# Patient Record
Sex: FEMALE | Race: WHITE | URBAN - METROPOLITAN AREA
[De-identification: names, ages, dates, MRNs, and addresses within clinical notes are randomized per-mention and may not be internally consistent; named-entity substitution may affect disease eponyms.]

---

## 2017-01-09 ENCOUNTER — OFFICE VISIT (OUTPATIENT)
Dept: ORTHOPEDIC SURGERY | Age: 47
End: 2017-01-09

## 2017-01-09 DIAGNOSIS — M25.571 ACUTE RIGHT ANKLE PAIN: Primary | ICD-10-CM

## 2017-01-09 PROCEDURE — 73610 X-RAY EXAM OF ANKLE: CPT | Performed by: ORTHOPAEDIC SURGERY

## 2017-01-09 PROCEDURE — 99213 OFFICE O/P EST LOW 20 MIN: CPT | Performed by: ORTHOPAEDIC SURGERY

## 2017-01-24 ENCOUNTER — TELEPHONE (OUTPATIENT)
Dept: ORTHOPEDIC SURGERY | Age: 47
End: 2017-01-24

## 2020-08-21 ENCOUNTER — OFFICE VISIT (OUTPATIENT)
Dept: ORTHOPEDIC SURGERY | Age: 50
End: 2020-08-21
Payer: COMMERCIAL

## 2020-08-21 VITALS — HEIGHT: 69 IN | WEIGHT: 280 LBS | BODY MASS INDEX: 41.47 KG/M2

## 2020-08-21 PROCEDURE — 99212 OFFICE O/P EST SF 10 MIN: CPT | Performed by: ORTHOPAEDIC SURGERY

## 2020-08-21 RX ORDER — METHYLPREDNISOLONE 4 MG/1
TABLET ORAL
Qty: 1 KIT | Refills: 0 | Status: SHIPPED | OUTPATIENT
Start: 2020-08-21

## 2020-08-21 NOTE — PROGRESS NOTES
Chief Complaint    Foot Pain (left foot)      History of Present Illness:  Rodolfo Cummings is a 52 y.o. femalehere for evaluation of her left foot. She states since Wednesday of this week she has had pain primarily over the fourth and fifth metatarsals of her left foot when she is walking. She does walk several miles daily and states that this pain came on after 1 day of walking. She does not remember specific injury. Denies any prior history of foot pain      Medical History:  Patient's medications, allergies, past medical, surgical, social and family histories were reviewed and updated as appropriate. Review of Systems:  Pertinent items are noted in HPI  Review of systems reviewed from Patient History Form dated on 8/21/20 and available in the patient's chart under the Media tab. Vital Signs:  Ht 5' 9\" (1.753 m)   Wt 280 lb (127 kg)   BMI 41.35 kg/m²     General Exam:   Constitutional: Patient is adequately groomed with no evidence of malnutrition  DTRs: Deep tendon reflexes are intact  Mental Status: The patient is oriented to time, place and person. The patient's mood and affect are appropriate. Lymphatic: The lymphatic examination bilaterally reveals all areas to be without enlargement or induration. Foot Examination:    Inspection: She does have some mild swelling more laterally based on the foot    Palpation: Tenderness palpation over the fourth metatarsal    Range of Motion: She does have great ankle range of motion    Strength: Good strength with resisted plantarflexion as well as dorsiflexion of the ankle    Special Tests: No instability to anterior drawer testing or varus stress testing    Skin: There are no rashes, ulcerations or lesions.     Gait: Antalgic    Reflex 2+ and symmetric      Radiology:     X-rays obtained and reviewed in office:  Views 3 views left foot demonstrates no obvious fracture dislocation or other osseous abnormalities            Assessment : Left foot pain    Impression:  Encounter Diagnosis   Name Primary?  Left foot pain Yes       Office Procedures:  Orders Placed This Encounter   Procedures    XR FOOT LEFT (MIN 3 VIEWS)     Standing Status:   Future     Number of Occurrences:   1     Standing Expiration Date:   8/21/2021        Treatment Plan: I discussed the diagnosis and treatment options with her today. I think this is likely stress reaction over the lateral border of her foot. Recommending at this time holding off on walking. She does have some orthotics that she can start using in her shoes. I will also place her on to a Medrol Dosepak to see if we can calm down her inflammation. I will see her back in clinic if this is not improving over the next 2 to 3 weeks.

## 2021-03-05 ENCOUNTER — IMMUNIZATION (OUTPATIENT)
Dept: PRIMARY CARE CLINIC | Age: 51
End: 2021-03-05
Payer: COMMERCIAL

## 2021-03-05 PROCEDURE — 91300 COVID-19, PFIZER VACCINE 30MCG/0.3ML DOSE: CPT | Performed by: FAMILY MEDICINE

## 2021-03-05 PROCEDURE — 0001A COVID-19, PFIZER VACCINE 30MCG/0.3ML DOSE: CPT | Performed by: FAMILY MEDICINE

## 2021-03-26 ENCOUNTER — IMMUNIZATION (OUTPATIENT)
Dept: PRIMARY CARE CLINIC | Age: 51
End: 2021-03-26
Payer: COMMERCIAL

## 2021-03-26 PROCEDURE — 91300 COVID-19, PFIZER VACCINE 30MCG/0.3ML DOSE: CPT | Performed by: FAMILY MEDICINE

## 2021-03-26 PROCEDURE — 0002A COVID-19, PFIZER VACCINE 30MCG/0.3ML DOSE: CPT | Performed by: FAMILY MEDICINE
